# Patient Record
Sex: FEMALE | Race: WHITE | NOT HISPANIC OR LATINO | URBAN - METROPOLITAN AREA
[De-identification: names, ages, dates, MRNs, and addresses within clinical notes are randomized per-mention and may not be internally consistent; named-entity substitution may affect disease eponyms.]

---

## 2020-11-15 ENCOUNTER — OUTPATIENT (OUTPATIENT)
Dept: OUTPATIENT SERVICES | Facility: HOSPITAL | Age: 75
LOS: 1 days | Discharge: HOME | End: 2020-11-15

## 2020-11-15 DIAGNOSIS — Z11.59 ENCOUNTER FOR SCREENING FOR OTHER VIRAL DISEASES: ICD-10-CM

## 2020-11-18 ENCOUNTER — OUTPATIENT (OUTPATIENT)
Dept: OUTPATIENT SERVICES | Facility: HOSPITAL | Age: 75
LOS: 1 days | Discharge: HOME | End: 2020-11-18

## 2020-11-18 VITALS — HEART RATE: 66 BPM | DIASTOLIC BLOOD PRESSURE: 67 MMHG | SYSTOLIC BLOOD PRESSURE: 153 MMHG | RESPIRATION RATE: 17 BRPM

## 2020-11-18 VITALS
RESPIRATION RATE: 18 BRPM | TEMPERATURE: 96 F | OXYGEN SATURATION: 99 % | DIASTOLIC BLOOD PRESSURE: 59 MMHG | HEIGHT: 63 IN | SYSTOLIC BLOOD PRESSURE: 139 MMHG | WEIGHT: 175.05 LBS | HEART RATE: 71 BPM

## 2020-11-18 DIAGNOSIS — Z98.890 OTHER SPECIFIED POSTPROCEDURAL STATES: Chronic | ICD-10-CM

## 2020-11-18 LAB — GLUCOSE BLDC GLUCOMTR-MCNC: 338 MG/DL — HIGH (ref 70–99)

## 2020-11-21 DIAGNOSIS — I10 ESSENTIAL (PRIMARY) HYPERTENSION: ICD-10-CM

## 2020-11-21 DIAGNOSIS — E10.9 TYPE 1 DIABETES MELLITUS WITHOUT COMPLICATIONS: ICD-10-CM

## 2020-11-21 DIAGNOSIS — H25.11 AGE-RELATED NUCLEAR CATARACT, RIGHT EYE: ICD-10-CM

## 2021-02-28 ENCOUNTER — OUTPATIENT (OUTPATIENT)
Dept: OUTPATIENT SERVICES | Facility: HOSPITAL | Age: 76
LOS: 1 days | Discharge: HOME | End: 2021-02-28

## 2021-02-28 DIAGNOSIS — Z11.59 ENCOUNTER FOR SCREENING FOR OTHER VIRAL DISEASES: ICD-10-CM

## 2021-02-28 DIAGNOSIS — Z98.890 OTHER SPECIFIED POSTPROCEDURAL STATES: Chronic | ICD-10-CM

## 2021-02-28 PROBLEM — Z87.898 PERSONAL HISTORY OF OTHER SPECIFIED CONDITIONS: Chronic | Status: ACTIVE | Noted: 2020-11-18

## 2021-03-02 NOTE — ASU PATIENT PROFILE, ADULT - PSH
History of surgery  uterine polypectomy   Acute cataract  right IOL  History of surgery  uterine polypectomy

## 2021-03-03 ENCOUNTER — OUTPATIENT (OUTPATIENT)
Dept: OUTPATIENT SERVICES | Facility: HOSPITAL | Age: 76
LOS: 1 days | Discharge: HOME | End: 2021-03-03

## 2021-03-03 VITALS
SYSTOLIC BLOOD PRESSURE: 133 MMHG | OXYGEN SATURATION: 98 % | WEIGHT: 175.05 LBS | HEIGHT: 63 IN | HEART RATE: 90 BPM | DIASTOLIC BLOOD PRESSURE: 60 MMHG | TEMPERATURE: 97 F | RESPIRATION RATE: 18 BRPM

## 2021-03-03 VITALS — DIASTOLIC BLOOD PRESSURE: 65 MMHG | RESPIRATION RATE: 17 BRPM | SYSTOLIC BLOOD PRESSURE: 118 MMHG | HEART RATE: 66 BPM

## 2021-03-03 DIAGNOSIS — H26.9 UNSPECIFIED CATARACT: Chronic | ICD-10-CM

## 2021-03-03 DIAGNOSIS — Z98.890 OTHER SPECIFIED POSTPROCEDURAL STATES: Chronic | ICD-10-CM

## 2021-03-03 LAB
GLUCOSE BLDC GLUCOMTR-MCNC: 307 MG/DL — HIGH (ref 70–99)
GLUCOSE BLDC GLUCOMTR-MCNC: 337 MG/DL — HIGH (ref 70–99)

## 2021-03-03 RX ORDER — FOLIC ACID 0.8 MG
0 TABLET ORAL
Qty: 0 | Refills: 0 | DISCHARGE

## 2021-03-03 RX ORDER — VERAPAMIL HCL 240 MG
1 CAPSULE, EXTENDED RELEASE PELLETS 24 HR ORAL
Qty: 0 | Refills: 0 | DISCHARGE

## 2021-03-03 RX ORDER — DIAZEPAM 5 MG
0 TABLET ORAL
Qty: 0 | Refills: 0 | DISCHARGE

## 2021-03-03 RX ORDER — CITALOPRAM 10 MG/1
1 TABLET, FILM COATED ORAL
Qty: 0 | Refills: 0 | DISCHARGE

## 2021-03-03 RX ORDER — OMEPRAZOLE 10 MG/1
1 CAPSULE, DELAYED RELEASE ORAL
Qty: 0 | Refills: 0 | DISCHARGE

## 2021-03-03 RX ORDER — LOSARTAN POTASSIUM 100 MG/1
1 TABLET, FILM COATED ORAL
Qty: 0 | Refills: 0 | DISCHARGE

## 2021-03-03 NOTE — ASU PREOP CHECKLIST - SELECT TESTS ORDERED
[id number] at 0718.  Dr Jaramillo notified/POCT Blood Glucose/COVID-19 UQ=[id number] at 0718.  Dr Jaramillo notified, covid negative 2/28/2021/POCT Blood Glucose/COVID-19

## 2021-03-03 NOTE — CHART NOTE - NSCHARTNOTEFT_GEN_A_CORE
[facility]  [facility] [id number]    Notified by RN for patient  HUE BELLAMY  75y  Female    Complaint of Fall s/p procedure while being discharged.   According to staff and patient, while she was getting dressed specifically putting pants on , she lost her balance while trying to get her leg into her pants, and fell backwards on to her buttocks.  Patient states she did not hit her head, or back, and denies hitting her arms.  Denies dizziness pre or post fall. Patient admits to chronic right knee issue which has caused a fall once before at home.   Patient denies any pain at present time. Denies head, neck, back pain. Denies any dizziness or new visual concerns ( pt is s/p cataract sx)        T(C): 36.2 (03-03-21 @ 08:03), Max: 36.2 (03-03-21 @ 07:29)  HR: 66 (03-03-21 @ 09:29) (66 - 90)  BP: 118/65 (03-03-21 @ 09:29) (118/65 - 133/60)  RR: 17 (03-03-21 @ 09:29) (17 - 18)  SpO2: 98% (03-03-21 @ 08:03) (98% - 98%)  Wt(kg): --        PHYSICAL EXAM:    NERVOUS SYSTEM:  Alert & Oriented X3,  PULMONARY: Clear to percussion bilaterally;   CARDIOVASCULAR: Regular rate and rhythm;    EXTREMITIES:   muscle tone was normal in all four extremities, muscle strength was normal in all four extremities and normal bulk in all four extremities.   No tenderness in any extremity  No tenderness to entire spinal column; no diminished range of motion.   No contusions, edema, or tenderness   No tenderness to palpation of b/l hip      Assesment/Plan:    Patient advised to monitor for pain or any change in symptoms.  Patient is able to ambulate unassisted and has no gait abnormalities or pain.  Patient has no concerning issue for further medical testing due to fall, d/w patient and rn for patient to follow up   outpatient primary care provider and if patient has any new medical concerns to return to ED without delay.            Discussed with Provider:

## 2021-03-03 NOTE — PACU DISCHARGE NOTE - PAIN:
Medication not passing protocol, last refilled 1-.  Patient last seen 2-3-2020, no pending appointment.  Please advise.   Controlled with current regime

## 2021-03-08 DIAGNOSIS — H25.812 COMBINED FORMS OF AGE-RELATED CATARACT, LEFT EYE: ICD-10-CM

## 2021-03-08 DIAGNOSIS — E11.9 TYPE 2 DIABETES MELLITUS WITHOUT COMPLICATIONS: ICD-10-CM

## 2021-03-08 DIAGNOSIS — Z79.4 LONG TERM (CURRENT) USE OF INSULIN: ICD-10-CM

## 2023-02-23 NOTE — PACU DISCHARGE NOTE - MENTAL STATUS: PATIENT PARTICIPATION
"Chief Complaint   Patient presents with    Follow-Up     Medication working well.       HPI:  Patient is a 66 y.o. female established patient who presents today for a follow up appointment. Since our visit together 1/12/23, patient has been taking Lexapro 10 mg daily for management of moderate recurrent major depressive disorder. She reports that her boyfriend/family notice significant improvement in her mood and energy levels. She denies medication related side effects and would like to continue medication use. She has recently recovered from COVID 19 infection and has step two of bilateral breast reconstruction scheduled for April 6 in [place]. She denies safety concerns and is in good spirits overall.     Patient Active Problem List    Diagnosis Date Noted    PSVT (paroxysmal supraventricular tachycardia) (HCC) on Zio 8/2020     PAF (paroxysmal atrial fibrillation) (HCC) 7 hours on 6/30/2020 based on apple watch     Essential hypertension 11/13/2019    Secondary insomnia 11/13/2019    Lobular carcinoma of right breast (HCC) 11/03/2019    Vitamin D deficiency 05/21/2019    Mixed dyslipidemia 05/21/2019    Attention deficit hyperactivity disorder (ADHD), predominantly inattentive type 05/03/2019    History of cold sores 05/03/2019    History of basal cell carcinoma excision 05/03/2019       Past medical, surgical, family, and social history was reviewed and updated in Epic chart by me today.     Medications and allergies reviewed and updated in Epic chart by me today.     ROS:  Pertinent positives listed above in HPI. All other systems have been reviewed and are negative.    PE:   /62 (BP Location: Left arm, Patient Position: Sitting, BP Cuff Size: Adult)   Pulse 77   Temp 37.1 °C (98.7 °F) (Temporal)   Resp 17   Ht 1.753 m (5' 9\")   Wt 77.1 kg (169 lb 15.6 oz)   SpO2 94%   BMI 25.10 kg/m²   Vital signs reviewed with patient.     Gen: Well developed; well nourished; no acute distress; age " appropriate appearance   CV: Regular rate and rhythm; S1/ S2 present; no murmur, gallop or rub noted  Pulm: No respiratory distress; clear to ascultation b/l; no wheezing or stridor noted b/l  Extremities: No peripheral edema b/l LE extremities/ no clubbing nor cyanosis noted  Skin: Warm and dry; no rashes noted   Neuro: No focal deficits noted   Psych: AAOx4; mood and affect are appropriate    A/P:  1. Moderate episode of recurrent major depressive disorder (HCC)  Markedly improved since patient started taking Lexapro 10 mg daily in mid January. Patient has significant family support and is considering establishing with mental health counselor. No safety concerns present, and I recommend patient continue daily Lexapro use. New RX sent to pharmacy.   - escitalopram (LEXAPRO) 10 MG Tab; Take 1 Tablet by mouth every day.  Dispense: 90 Tablet; Refill: 3       Awake